# Patient Record
Sex: MALE | Race: WHITE | NOT HISPANIC OR LATINO | Employment: UNEMPLOYED | URBAN - METROPOLITAN AREA
[De-identification: names, ages, dates, MRNs, and addresses within clinical notes are randomized per-mention and may not be internally consistent; named-entity substitution may affect disease eponyms.]

---

## 2020-05-13 ENCOUNTER — LAB VISIT (OUTPATIENT)
Dept: PRIMARY CARE CLINIC | Facility: CLINIC | Age: 30
End: 2020-05-13
Payer: MEDICAID

## 2020-05-13 DIAGNOSIS — Z00.6 RESEARCH STUDY PATIENT: Primary | ICD-10-CM

## 2020-05-13 PROCEDURE — U0002 COVID-19 LAB TEST NON-CDC: HCPCS

## 2020-05-13 PROCEDURE — 86769 SARS-COV-2 COVID-19 ANTIBODY: CPT

## 2020-05-13 NOTE — RESEARCH
Date of Consent: 05/13/2020    Sponsor: [facility]    Study Title/IRB Number: Observational study of Sars-CoV2 Immunoglobulin G (IgG) seroprevalence among the [facility] population over time 2020.163  Principle Investigator: Barbara Biswas, PhD    Did the patient need translation services? No   name: N/A    Prior to the Informed Consent (IC) being signed, or any study protocol required data collection, testing, procedure, or intervention being performed, the following was done and/or discussed:   Patient was given a paper copy of the IC for review    Patient was given study FAQ   Purpose of the study and qualifications to participate    Study design and tests or procedures done at this visit   Confidentiality and HIPAA Authorization for Release of Medical Records for the research trial/ subject's rights/research related injury   Risk, Benefits, Alternative Treatments, Compensation and Costs   Participation in the research trial is voluntary and patient may withdraw at anytime   Contact information for study related questions    Patient verbalizes understanding of the above: Yes  Contact information for PI and IRB given to patient: Yes  Patient able to adequately summarize: the purpose of the study, the risks associated with the study, and all procedures, testing, and follow-ups associated with the study: Yes    The consent was discussed verbally with the patient and all questions were answered satisfactorily. Patient gave verbal consent for the Seroprevalence research study with an IRB approval date of 05/08/2020.      The Consent, Consent Witness and name of Clinical Research Coordinator consenting was captured and documented in REDCap.    All Inclusion and Exclusion Criteria reviewed, subject meets all Inclusion criteria and does not meet any Exclusion Criteria at this time.     Patient Eligibility was confirmed.    Patient responded to survey questions.    The following biospecimen  collection procedures were collected:    -Nasopharyngeal Swab Collection  -Blood collection

## 2020-05-14 LAB — SARS-COV-2 IGG SERPLBLD QL IA.RAPID: NEGATIVE

## 2020-05-15 LAB — SARS-COV-2 RNA RESP QL NAA+PROBE: NOT DETECTED

## 2020-10-01 ENCOUNTER — TELEPHONE (OUTPATIENT)
Dept: INTERNAL MEDICINE | Facility: CLINIC | Age: 30
End: 2020-10-01

## 2020-10-01 NOTE — TELEPHONE ENCOUNTER
----- Message from Rocio Pearson sent at 9/30/2020  2:18 PM CDT -----  Name of Who is Calling: NANCI BELL [[id number]]    What is the request in detail: Pt is requesting a call back regards to est  care ....Please contact to further discuss and advise      Can the clinic reply by [facility]: Yes     What Number to Call Back if not in [facility]:  [phone] (home)

## 2022-08-12 ENCOUNTER — TELEPHONE (OUTPATIENT)
Dept: PSYCHIATRY | Facility: CLINIC | Age: 32
End: 2022-08-12
Payer: MEDICAID

## 2022-09-29 ENCOUNTER — OFFICE VISIT (OUTPATIENT)
Dept: PSYCHIATRY | Facility: CLINIC | Age: 32
End: 2022-09-29
Payer: MEDICAID

## 2022-09-29 ENCOUNTER — LAB VISIT (OUTPATIENT)
Dept: LAB | Facility: OTHER | Age: 32
End: 2022-09-29
Attending: PSYCHIATRY & NEUROLOGY
Payer: MEDICAID

## 2022-09-29 VITALS
BODY MASS INDEX: 19.64 KG/M2 | HEIGHT: 72 IN | SYSTOLIC BLOOD PRESSURE: 142 MMHG | HEART RATE: 89 BPM | DIASTOLIC BLOOD PRESSURE: 75 MMHG | WEIGHT: 145 LBS

## 2022-09-29 DIAGNOSIS — F90.0 ADHD, PREDOMINANTLY INATTENTIVE TYPE: ICD-10-CM

## 2022-09-29 DIAGNOSIS — Z13.39 ADHD (ATTENTION DEFICIT HYPERACTIVITY DISORDER) EVALUATION: ICD-10-CM

## 2022-09-29 DIAGNOSIS — Z13.39 ADHD (ATTENTION DEFICIT HYPERACTIVITY DISORDER) EVALUATION: Primary | ICD-10-CM

## 2022-09-29 LAB
AMPHET+METHAMPHET UR QL: NEGATIVE
BARBITURATES UR QL SCN>200 NG/ML: NEGATIVE
BENZODIAZ UR QL SCN>200 NG/ML: NEGATIVE
BZE UR QL SCN: NEGATIVE
CANNABINOIDS UR QL SCN: NEGATIVE
CREAT UR-MCNC: 31.3 MG/DL (ref 23–375)
ETHANOL UR-MCNC: <10 MG/DL
METHADONE UR QL SCN>300 NG/ML: NEGATIVE
OPIATES UR QL SCN: NEGATIVE
PCP UR QL SCN>25 NG/ML: NEGATIVE
TOXICOLOGY INFORMATION: NORMAL

## 2022-09-29 PROCEDURE — 99213 OFFICE O/P EST LOW 20 MIN: CPT | Mod: PBBFAC | Performed by: PSYCHIATRY & NEUROLOGY

## 2022-09-29 PROCEDURE — 90792 PSYCH DIAG EVAL W/MED SRVCS: CPT | Mod: AF,HB,, | Performed by: PSYCHIATRY & NEUROLOGY

## 2022-09-29 PROCEDURE — 99999 PR PBB SHADOW E&M-EST. PATIENT-LVL III: ICD-10-PCS | Mod: PBBFAC,AF,HB, | Performed by: PSYCHIATRY & NEUROLOGY

## 2022-09-29 PROCEDURE — 3078F DIAST BP <80 MM HG: CPT | Mod: AF,HB,CPTII, | Performed by: PSYCHIATRY & NEUROLOGY

## 2022-09-29 PROCEDURE — 3077F PR MOST RECENT SYSTOLIC BLOOD PRESSURE >= 140 MM HG: ICD-10-PCS | Mod: AF,HB,CPTII, | Performed by: PSYCHIATRY & NEUROLOGY

## 2022-09-29 PROCEDURE — 99999 PR PBB SHADOW E&M-EST. PATIENT-LVL III: CPT | Mod: PBBFAC,AF,HB, | Performed by: PSYCHIATRY & NEUROLOGY

## 2022-09-29 PROCEDURE — 3078F PR MOST RECENT DIASTOLIC BLOOD PRESSURE < 80 MM HG: ICD-10-PCS | Mod: AF,HB,CPTII, | Performed by: PSYCHIATRY & NEUROLOGY

## 2022-09-29 PROCEDURE — 1159F PR MEDICATION LIST DOCUMENTED IN MEDICAL RECORD: ICD-10-PCS | Mod: AF,HB,CPTII, | Performed by: PSYCHIATRY & NEUROLOGY

## 2022-09-29 PROCEDURE — 90792 PR PSYCHIATRIC DIAGNOSTIC EVALUATION W/MEDICAL SERVICES: ICD-10-PCS | Mod: AF,HB,, | Performed by: PSYCHIATRY & NEUROLOGY

## 2022-09-29 PROCEDURE — 1160F RVW MEDS BY RX/DR IN RCRD: CPT | Mod: AF,HB,CPTII, | Performed by: PSYCHIATRY & NEUROLOGY

## 2022-09-29 PROCEDURE — 1159F MED LIST DOCD IN RCRD: CPT | Mod: AF,HB,CPTII, | Performed by: PSYCHIATRY & NEUROLOGY

## 2022-09-29 PROCEDURE — 1160F PR REVIEW ALL MEDS BY PRESCRIBER/CLIN PHARMACIST DOCUMENTED: ICD-10-PCS | Mod: AF,HB,CPTII, | Performed by: PSYCHIATRY & NEUROLOGY

## 2022-09-29 PROCEDURE — 3077F SYST BP >= 140 MM HG: CPT | Mod: AF,HB,CPTII, | Performed by: PSYCHIATRY & NEUROLOGY

## 2022-09-29 PROCEDURE — 80307 DRUG TEST PRSMV CHEM ANLYZR: CPT | Performed by: PSYCHIATRY & NEUROLOGY
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
## 2022-09-29 NOTE — PATIENT INSTRUCTIONS
Urine drug screen. If negative, to start Adderall XR 10mg in the morning.    Follow up in 4 weeks

## 2022-10-03 ENCOUNTER — PATIENT MESSAGE (OUTPATIENT)
Dept: PSYCHIATRY | Facility: CLINIC | Age: 32
End: 2022-10-03
Payer: MEDICAID

## 2022-10-04 DIAGNOSIS — F90.0 ADHD, PREDOMINANTLY INATTENTIVE TYPE: ICD-10-CM

## 2022-10-04 RX ORDER — DEXTROAMPHETAMINE SACCHARATE, AMPHETAMINE ASPARTATE MONOHYDRATE, DEXTROAMPHETAMINE SULFATE AND AMPHETAMINE SULFATE 2.5; 2.5; 2.5; 2.5 MG/1; MG/1; MG/1; MG/1
10 CAPSULE, EXTENDED RELEASE ORAL EVERY MORNING
Qty: 30 CAPSULE | Refills: 0 | Status: SHIPPED | OUTPATIENT
Start: 2022-10-04 | End: 2022-11-02

## 2022-10-04 RX ORDER — DEXTROAMPHETAMINE SACCHARATE, AMPHETAMINE ASPARTATE MONOHYDRATE, DEXTROAMPHETAMINE SULFATE AND AMPHETAMINE SULFATE 2.5; 2.5; 2.5; 2.5 MG/1; MG/1; MG/1; MG/1
10 CAPSULE, EXTENDED RELEASE ORAL EVERY MORNING
Qty: 30 CAPSULE | Refills: 0 | Status: SHIPPED | OUTPATIENT
Start: 2022-10-04 | End: 2022-10-04 | Stop reason: SDUPTHER

## 2022-10-12 PROBLEM — Z13.39 ADHD (ATTENTION DEFICIT HYPERACTIVITY DISORDER) EVALUATION: Status: ACTIVE | Noted: 2022-10-12

## 2022-11-02 ENCOUNTER — OFFICE VISIT (OUTPATIENT)
Dept: PSYCHIATRY | Facility: CLINIC | Age: 32
End: 2022-11-02
Payer: MEDICAID

## 2022-11-02 VITALS
HEART RATE: 84 BPM | DIASTOLIC BLOOD PRESSURE: 73 MMHG | SYSTOLIC BLOOD PRESSURE: 125 MMHG | BODY MASS INDEX: 19.7 KG/M2 | TEMPERATURE: 98 F | WEIGHT: 145.31 LBS

## 2022-11-02 DIAGNOSIS — F90.0 ADHD, PREDOMINANTLY INATTENTIVE TYPE: Primary | ICD-10-CM

## 2022-11-02 PROCEDURE — 3008F BODY MASS INDEX DOCD: CPT | Mod: AF,HB,CPTII, | Performed by: PSYCHIATRY & NEUROLOGY

## 2022-11-02 PROCEDURE — 1160F PR REVIEW ALL MEDS BY PRESCRIBER/CLIN PHARMACIST DOCUMENTED: ICD-10-PCS | Mod: AF,HB,CPTII, | Performed by: PSYCHIATRY & NEUROLOGY

## 2022-11-02 PROCEDURE — 3078F DIAST BP <80 MM HG: CPT | Mod: AF,HB,CPTII, | Performed by: PSYCHIATRY & NEUROLOGY

## 2022-11-02 PROCEDURE — 3078F PR MOST RECENT DIASTOLIC BLOOD PRESSURE < 80 MM HG: ICD-10-PCS | Mod: AF,HB,CPTII, | Performed by: PSYCHIATRY & NEUROLOGY

## 2022-11-02 PROCEDURE — 1159F MED LIST DOCD IN RCRD: CPT | Mod: AF,HB,CPTII, | Performed by: PSYCHIATRY & NEUROLOGY

## 2022-11-02 PROCEDURE — 99213 OFFICE O/P EST LOW 20 MIN: CPT | Mod: PBBFAC | Performed by: PSYCHIATRY & NEUROLOGY

## 2022-11-02 PROCEDURE — 1159F PR MEDICATION LIST DOCUMENTED IN MEDICAL RECORD: ICD-10-PCS | Mod: AF,HB,CPTII, | Performed by: PSYCHIATRY & NEUROLOGY

## 2022-11-02 PROCEDURE — 99214 OFFICE O/P EST MOD 30 MIN: CPT | Mod: S$PBB,AF,HB, | Performed by: PSYCHIATRY & NEUROLOGY

## 2022-11-02 PROCEDURE — 99999 PR PBB SHADOW E&M-EST. PATIENT-LVL III: CPT | Mod: PBBFAC,AF,HB, | Performed by: PSYCHIATRY & NEUROLOGY

## 2022-11-02 PROCEDURE — 1160F RVW MEDS BY RX/DR IN RCRD: CPT | Mod: AF,HB,CPTII, | Performed by: PSYCHIATRY & NEUROLOGY

## 2022-11-02 PROCEDURE — 3008F PR BODY MASS INDEX (BMI) DOCUMENTED: ICD-10-PCS | Mod: AF,HB,CPTII, | Performed by: PSYCHIATRY & NEUROLOGY

## 2022-11-02 PROCEDURE — 99214 PR OFFICE/OUTPT VISIT, EST, LEVL IV, 30-39 MIN: ICD-10-PCS | Mod: S$PBB,AF,HB, | Performed by: PSYCHIATRY & NEUROLOGY

## 2022-11-02 PROCEDURE — 99999 PR PBB SHADOW E&M-EST. PATIENT-LVL III: ICD-10-PCS | Mod: PBBFAC,AF,HB, | Performed by: PSYCHIATRY & NEUROLOGY

## 2022-11-02 PROCEDURE — 3074F PR MOST RECENT SYSTOLIC BLOOD PRESSURE < 130 MM HG: ICD-10-PCS | Mod: AF,HB,CPTII, | Performed by: PSYCHIATRY & NEUROLOGY

## 2022-11-02 PROCEDURE — 3074F SYST BP LT 130 MM HG: CPT | Mod: AF,HB,CPTII, | Performed by: PSYCHIATRY & NEUROLOGY

## 2022-11-02 NOTE — PROGRESS NOTES
"Subsequent Psychiatric Session    32 y.o. male    Reason for Follow Up:   1. ADHD, predominantly inattentive type          Current Medications:    Current Outpatient Medications:     methylphenidate HCl 36 MG CR tablet, Take 1 tablet (36 mg total) by mouth every morning., Disp: 30 tablet, Rfl: 0     Date of Last Visit:   9/29/2022    In Clinic Since:   September 2022  Therapy:    No    Interval History  Patient describes current mood as "more on edge." The anxiety "comes and goes." He still has some trouble retaining information but feels "more engaged with work and reading socially." He likes reading on mysticism and kabbalism. He thinks the medication works from about 9am to 6pm. He does not have appetite suppression. Denies medication side effects. Denies SI, HI, AVH.    Patient's short term memory is tested, and he correctly recalls 3/3 words.    Manic/Hypomanic Symptom Screening:  Since the last session, have you had any periods lasting at least a few days where your energy level was higher than normal and you did not need as much sleep at night to function the following day?: No    Substance Use Screening:  Alcohol: 2/30 days  Cannabis: No  Anything recreational that is not prescribed: No    Review of Measures  PHQ-9: 10  RISHI-7: 5    Scores from last session:   PHQ9 Score:              4/27  GAD7 Score:              5/21  MoCA:                         25/30  ASRS:                         see media    Review of PDMP  Reviewed this session Filled Adderall on 10/04    Constitutional     VITAL SIGNS  Temp 98.2 °F (36.8 °C)   /73    HR 84    RR      SpO2           Lab Visit on 09/29/2022   Component Date Value Ref Range Status    Alcohol, Urine 09/29/2022 <10  <10 mg/dL Final    Benzodiazepines 09/29/2022 Negative  Negatiive Final    Methadone metabolites 09/29/2022 Negative  Negative Final    Cocaine (Metab.) 09/29/2022 Negative  Negative Final    Opiate Scrn, Ur 09/29/2022 Negative  Negative Final    " "Barbiturate Screen, Ur 09/29/2022 Negative  Negative Final    Amphetamine Screen, Ur 09/29/2022 Negative  Negative Final    THC 09/29/2022 Negative  Negative Final    Phencyclidine 09/29/2022 Negative  Negative Final    Creatinine, Urine 09/29/2022 31.3  23.0 - 375.0 mg/dL Final    Toxicology Information 09/29/2022 SEE COMMENT   Final    Comment: This screen includes the following classes of drugs at the listed   cut-off:    Benzodiazepines 200 ng/ml  Methadone 300 ng/ml  Cocaine metabolite 300 ng/ml  Opiates 300 ng/ml  Barbiturates 200 ng/ml  Amphetamines 1000 ng/ml  Marijuana metabs (THC) 50 ng/ml  Phencyclidine (PCP) 25 ng/ml    This is a screening test. If results do not correlate with clinical   presentation, then a confirmatory send out test is advised.     This report is intended for use in clinical monitoring and management   of   patients. It is not intended for use in employment related drug   testing.          MENTAL STATUS EXAM  General:  Alert and oriented x 4 Appearance is good grooming and hygiene, appears stated age, Body mass index is 19.67 kg/m². . Behavior: cooperative, eye contact normal.  Gait, Posture and Muscle Strength/Tone: Normal gait and posture observed while watching patient walk to exam room. No gross abnormal movements noted.  Psychomotor Agitation and Retardation: none evident  Speech: Normal rate, volume, articulation, and tone.  Mood: "more on edge"  Affect: blunted  Thought Process: Linear and coherent. No flight of ideas.  Associations:  Intact. No loosening of associations.  Thought content: Denies suicidal and homicidal thoughts, plans and intentions.  Perceptions: Denies any auditory or visual hallucinations. Does not appear internally preoccupied.  Orientation: Alert and oriented to person, place, date and situation  Attention and Concentration: Intact.   Memory: Intact grossly   Language: No deficits noted   Fund of Knowledge: appropriate for age and level and education. "   Insight:   good  Judgment: good  Impulse control: good      ASSESSMENT  Problem List Items Addressed This Visit          Psychiatric    ADHD, predominantly inattentive type - Primary    Relevant Medications    methylphenidate HCl 36 MG CR tablet     Will switch to Concerta since patient feels more on edge with adderall.     PLAN  Patient Instructions   Switch Adderall XR 10mg daily to Concerta 36mg daily.    Follow up in 4 weeks      -------------------------------------------------------------------------------    Natalie Herron  [facility] - PSYCHIATRY    Today's encounter took a total time of 30 minutes, and that time included patient interview and documentation.    Risks, Benefits, Side Effects and Alternatives were discussed with the patient today and consent was obtained for the current medication regimen. The patient was encouraged to ask questions and these questions were answered and the patient was engaged in psychoeducation regarding diagnosis, course of illness, accuracy of the diagnosis, and other general elements regarding overall diagnosis and treatment consideration.     Any medications being used off-label were discussed with the patient inclusive of the evidence base for the use of the medications and consent was obtained for the off-label use of the medication.     Brief suicide risk assessment was performed with the patient today and safety planning was performed if indicated.    Note completed by: Natalie Herron MD, 11/5/2022 11:02 AM

## 2022-11-03 ENCOUNTER — PATIENT MESSAGE (OUTPATIENT)
Dept: PSYCHIATRY | Facility: CLINIC | Age: 32
End: 2022-11-03
Payer: MEDICAID

## 2022-11-03 RX ORDER — METHYLPHENIDATE HYDROCHLORIDE 36 MG/1
36 TABLET ORAL EVERY MORNING
Qty: 30 TABLET | Refills: 0 | Status: SHIPPED | OUTPATIENT
Start: 2022-11-03 | End: 2022-11-08 | Stop reason: SDUPTHER

## 2022-11-08 ENCOUNTER — PATIENT MESSAGE (OUTPATIENT)
Dept: PSYCHIATRY | Facility: CLINIC | Age: 32
End: 2022-11-08
Payer: MEDICAID

## 2022-11-08 DIAGNOSIS — F90.0 ADHD, PREDOMINANTLY INATTENTIVE TYPE: ICD-10-CM

## 2022-11-08 RX ORDER — METHYLPHENIDATE HYDROCHLORIDE 36 MG/1
36 TABLET ORAL EVERY MORNING
Qty: 30 TABLET | Refills: 0 | Status: SHIPPED | OUTPATIENT
Start: 2022-11-08 | End: 2022-11-16 | Stop reason: SDUPTHER

## 2022-11-16 ENCOUNTER — PATIENT MESSAGE (OUTPATIENT)
Dept: PSYCHIATRY | Facility: CLINIC | Age: 32
End: 2022-11-16
Payer: MEDICAID

## 2022-11-16 DIAGNOSIS — F90.0 ADHD, PREDOMINANTLY INATTENTIVE TYPE: ICD-10-CM

## 2022-11-16 RX ORDER — METHYLPHENIDATE HYDROCHLORIDE 36 MG/1
36 TABLET ORAL EVERY MORNING
Qty: 30 TABLET | Refills: 0 | Status: SHIPPED | OUTPATIENT
Start: 2022-11-16 | End: 2022-12-03

## 2022-11-28 ENCOUNTER — PATIENT MESSAGE (OUTPATIENT)
Dept: PSYCHIATRY | Facility: CLINIC | Age: 32
End: 2022-11-28
Payer: MEDICAID

## 2022-12-03 RX ORDER — DEXTROAMPHETAMINE SACCHARATE, AMPHETAMINE ASPARTATE MONOHYDRATE, DEXTROAMPHETAMINE SULFATE AND AMPHETAMINE SULFATE 2.5; 2.5; 2.5; 2.5 MG/1; MG/1; MG/1; MG/1
10 CAPSULE, EXTENDED RELEASE ORAL EVERY MORNING
Qty: 30 CAPSULE | Refills: 0 | Status: SHIPPED | OUTPATIENT
Start: 2022-12-03 | End: 2023-01-07 | Stop reason: SDUPTHER

## 2023-01-09 RX ORDER — DEXTROAMPHETAMINE SACCHARATE, AMPHETAMINE ASPARTATE MONOHYDRATE, DEXTROAMPHETAMINE SULFATE AND AMPHETAMINE SULFATE 2.5; 2.5; 2.5; 2.5 MG/1; MG/1; MG/1; MG/1
10 CAPSULE, EXTENDED RELEASE ORAL EVERY MORNING
Qty: 30 CAPSULE | Refills: 0 | Status: SHIPPED | OUTPATIENT
Start: 2023-01-09 | End: 2023-02-08 | Stop reason: SDUPTHER

## 2023-02-08 RX ORDER — DEXTROAMPHETAMINE SACCHARATE, AMPHETAMINE ASPARTATE MONOHYDRATE, DEXTROAMPHETAMINE SULFATE AND AMPHETAMINE SULFATE 2.5; 2.5; 2.5; 2.5 MG/1; MG/1; MG/1; MG/1
10 CAPSULE, EXTENDED RELEASE ORAL EVERY MORNING
Qty: 30 CAPSULE | Refills: 0 | Status: SHIPPED | OUTPATIENT
Start: 2023-02-08 | End: 2023-02-28 | Stop reason: SDUPTHER

## 2023-02-23 ENCOUNTER — TELEPHONE (OUTPATIENT)
Dept: PSYCHIATRY | Facility: CLINIC | Age: 33
End: 2023-02-23
Payer: MEDICAID

## 2023-02-28 ENCOUNTER — OFFICE VISIT (OUTPATIENT)
Dept: PSYCHIATRY | Facility: CLINIC | Age: 33
End: 2023-02-28
Payer: MEDICAID

## 2023-02-28 VITALS — HEART RATE: 81 BPM | WEIGHT: 145 LBS | BODY MASS INDEX: 19.67 KG/M2

## 2023-02-28 DIAGNOSIS — F90.0 ADHD, PREDOMINANTLY INATTENTIVE TYPE: Primary | ICD-10-CM

## 2023-02-28 PROCEDURE — 99214 PR OFFICE/OUTPT VISIT, EST, LEVL IV, 30-39 MIN: ICD-10-PCS | Mod: 95,,, | Performed by: PSYCHIATRY & NEUROLOGY

## 2023-02-28 PROCEDURE — 1160F PR REVIEW ALL MEDS BY PRESCRIBER/CLIN PHARMACIST DOCUMENTED: ICD-10-PCS | Mod: CPTII,95,, | Performed by: PSYCHIATRY & NEUROLOGY

## 2023-02-28 PROCEDURE — 1159F MED LIST DOCD IN RCRD: CPT | Mod: CPTII,95,, | Performed by: PSYCHIATRY & NEUROLOGY

## 2023-02-28 PROCEDURE — 1160F RVW MEDS BY RX/DR IN RCRD: CPT | Mod: CPTII,95,, | Performed by: PSYCHIATRY & NEUROLOGY

## 2023-02-28 PROCEDURE — 3008F BODY MASS INDEX DOCD: CPT | Mod: CPTII,95,, | Performed by: PSYCHIATRY & NEUROLOGY

## 2023-02-28 PROCEDURE — 1159F PR MEDICATION LIST DOCUMENTED IN MEDICAL RECORD: ICD-10-PCS | Mod: CPTII,95,, | Performed by: PSYCHIATRY & NEUROLOGY

## 2023-02-28 PROCEDURE — 3008F PR BODY MASS INDEX (BMI) DOCUMENTED: ICD-10-PCS | Mod: CPTII,95,, | Performed by: PSYCHIATRY & NEUROLOGY

## 2023-02-28 PROCEDURE — 99214 OFFICE O/P EST MOD 30 MIN: CPT | Mod: 95,,, | Performed by: PSYCHIATRY & NEUROLOGY

## 2023-02-28 RX ORDER — DEXTROAMPHETAMINE SACCHARATE, AMPHETAMINE ASPARTATE, DEXTROAMPHETAMINE SULFATE AND AMPHETAMINE SULFATE 2.5; 2.5; 2.5; 2.5 MG/1; MG/1; MG/1; MG/1
10 TABLET ORAL DAILY
Qty: 30 TABLET | Refills: 0 | Status: SHIPPED | OUTPATIENT
Start: 2023-02-28 | End: 2023-03-01 | Stop reason: SDUPTHER

## 2023-02-28 RX ORDER — DEXTROAMPHETAMINE SACCHARATE, AMPHETAMINE ASPARTATE MONOHYDRATE, DEXTROAMPHETAMINE SULFATE AND AMPHETAMINE SULFATE 2.5; 2.5; 2.5; 2.5 MG/1; MG/1; MG/1; MG/1
10 CAPSULE, EXTENDED RELEASE ORAL DAILY
Qty: 30 CAPSULE | Refills: 0 | Status: SHIPPED | OUTPATIENT
Start: 2023-03-08 | End: 2023-03-01 | Stop reason: SDUPTHER

## 2023-02-28 NOTE — PATIENT INSTRUCTIONS
Try taking Adderall XR 10mg closer to 8:30am.  Start Adderall IR 10mg in the afternoon    Follow up in May.

## 2023-02-28 NOTE — PROGRESS NOTES
"Subsequent Psychiatric Session    32 y.o. male    Reason for Follow Up:   1. ADHD, predominantly inattentive type        Current Medications:    Current Outpatient Medications:     [START ON 3/8/2023] dextroamphetamine-amphetamine (ADDERALL XR) 10 MG 24 hr capsule, Take 1 capsule (10 mg total) by mouth once daily., Disp: 30 capsule, Rfl: 0    dextroamphetamine-amphetamine (ADDERALL) 10 mg Tab, Take 1 tablet (10 mg total) by mouth once daily., Disp: 30 tablet, Rfl: 0     Date of Last Visit:   11/02/2022    In Clinic Since:   September 2022  Therapy:    No    Interval History  Patient says that overall: "I've been doing fine." Patient feels more "sociable" and "less in my own head," but it has a limited effect: "The medication seems to work for the first few hours and then I get pretty tired around 2 or 3pm." He takes it at 7:30am. Adderall is a better medication than Concerta. Concerta caused nausea. His memory has not improved. Denies medication side effects, including insomnia and appetite suppression. Denies SI, HI, AVH.    Manic/Hypomanic Symptom Screening:  Since the last session, have you had any periods lasting at least a few days where your energy level was higher than normal and you did not need as much sleep at night to function the following day?: No    Substance Use Screening:  Alcohol: 4/30 days  Cannabis: No  Anything recreational that is not prescribed: No    Review of Measures  PHQ-9: 2  RISHI-7: 2    Scores from last session:   PHQ-9: 10  RISHI-7: 5    Scores from initial session:  PHQ9 Score:              4/27  GAD7 Score:              5/21  MoCA:                         25/30  ASRS:                         see media    Review of PDMP  Reviewed this session Filled Adderall on 02/14    Constitutional     VITAL SIGNS  Temp     BP      HR 81    RR      SpO2           No visits with results within 14 Week(s) from this visit.   Latest known visit with results is:   Lab Visit on 09/29/2022   Component Date " "Value Ref Range Status    Alcohol, Urine 09/29/2022 <10  <10 mg/dL Final    Benzodiazepines 09/29/2022 Negative  Negatiive Final    Methadone metabolites 09/29/2022 Negative  Negative Final    Cocaine (Metab.) 09/29/2022 Negative  Negative Final    Opiate Scrn, Ur 09/29/2022 Negative  Negative Final    Barbiturate Screen, Ur 09/29/2022 Negative  Negative Final    Amphetamine Screen, Ur 09/29/2022 Negative  Negative Final    THC 09/29/2022 Negative  Negative Final    Phencyclidine 09/29/2022 Negative  Negative Final    Creatinine, Urine 09/29/2022 31.3  23.0 - 375.0 mg/dL Final    Toxicology Information 09/29/2022 SEE COMMENT   Final    Comment: This screen includes the following classes of drugs at the listed   cut-off:    Benzodiazepines 200 ng/ml  Methadone 300 ng/ml  Cocaine metabolite 300 ng/ml  Opiates 300 ng/ml  Barbiturates 200 ng/ml  Amphetamines 1000 ng/ml  Marijuana metabs (THC) 50 ng/ml  Phencyclidine (PCP) 25 ng/ml    This is a screening test. If results do not correlate with clinical   presentation, then a confirmatory send out test is advised.     This report is intended for use in clinical monitoring and management   of   patients. It is not intended for use in employment related drug   testing.          MENTAL STATUS EXAM  General:  Alert and oriented x 4 Appearance is good grooming and hygiene, appears stated age, Body mass index is 19.67 kg/m². Behavior: cooperative, eye contact normal.  Gait, Posture and Muscle Strength/Tone: Normal gait and posture observed. No gross abnormal movements noted.  Psychomotor Agitation and Retardation: none evident  Speech: Normal rate, volume, articulation, and tone.  Mood: "better"  Affect: blunted  Thought Process: Linear and coherent. No flight of ideas.  Associations:  Intact. No loosening of associations.  Thought content: Denies suicidal and homicidal thoughts, plans and intentions.  Perceptions: Denies any auditory or visual hallucinations. Does not appear " internally preoccupied.  Orientation: Alert and oriented to person, place, date and situation  Attention and Concentration: Intact.   Memory: Intact grossly   Language: No deficits noted   Fund of Knowledge: appropriate for age and level and education.   Insight:   good  Judgment: good  Impulse control: good      ASSESSMENT  Problem List Items Addressed This Visit          Psychiatric    ADHD, predominantly inattentive type - Primary    Relevant Medications    dextroamphetamine-amphetamine (ADDERALL) 10 mg Tab    dextroamphetamine-amphetamine (ADDERALL XR) 10 MG 24 hr capsule (Start on 3/8/2023)     PLAN  Patient Instructions   Try taking Adderall XR 10mg closer to 8:30am.  Start Adderall IR 10mg in the afternoon    Follow up in May.      -------------------------------------------------------------------------------    Natalie Herron  [facility] - PSYCHIATRY    Today's encounter took a total time of 30 minutes, and that time included patient interview and documentation.    Due to the pandemic, today's session was performed over video and then phone due to audio issues. Patient is confirmed to be in the [facility]. The participants are patient and myself.     Risks, Benefits, Side Effects and Alternatives were discussed with the patient today and consent was obtained for the current medication regimen. The patient was encouraged to ask questions and these questions were answered and the patient was engaged in psychoeducation regarding diagnosis, course of illness, accuracy of the diagnosis, and other general elements regarding overall diagnosis and treatment consideration.     Any medications being used off-label were discussed with the patient inclusive of the evidence base for the use of the medications and consent was obtained for the off-label use of the medication.     Brief suicide risk assessment was performed with the patient today and safety planning was performed if indicated.    Note completed by:  Natalie Herron MD, 2/28/2023 11:02 AM

## 2023-03-01 ENCOUNTER — PATIENT MESSAGE (OUTPATIENT)
Dept: PSYCHIATRY | Facility: CLINIC | Age: 33
End: 2023-03-01
Payer: MEDICAID

## 2023-03-01 DIAGNOSIS — F90.0 ADHD, PREDOMINANTLY INATTENTIVE TYPE: ICD-10-CM

## 2023-03-01 RX ORDER — DEXTROAMPHETAMINE SACCHARATE, AMPHETAMINE ASPARTATE, DEXTROAMPHETAMINE SULFATE AND AMPHETAMINE SULFATE 2.5; 2.5; 2.5; 2.5 MG/1; MG/1; MG/1; MG/1
10 TABLET ORAL DAILY
Qty: 30 TABLET | Refills: 0 | Status: SHIPPED | OUTPATIENT
Start: 2023-03-01 | End: 2023-03-02 | Stop reason: SDUPTHER

## 2023-03-01 RX ORDER — DEXTROAMPHETAMINE SACCHARATE, AMPHETAMINE ASPARTATE MONOHYDRATE, DEXTROAMPHETAMINE SULFATE AND AMPHETAMINE SULFATE 2.5; 2.5; 2.5; 2.5 MG/1; MG/1; MG/1; MG/1
10 CAPSULE, EXTENDED RELEASE ORAL DAILY
Qty: 30 CAPSULE | Refills: 0 | Status: SHIPPED | OUTPATIENT
Start: 2023-03-08 | End: 2023-03-02 | Stop reason: SDUPTHER

## 2023-03-02 DIAGNOSIS — F90.0 ADHD, PREDOMINANTLY INATTENTIVE TYPE: ICD-10-CM

## 2023-03-02 RX ORDER — DEXTROAMPHETAMINE SACCHARATE, AMPHETAMINE ASPARTATE, DEXTROAMPHETAMINE SULFATE AND AMPHETAMINE SULFATE 2.5; 2.5; 2.5; 2.5 MG/1; MG/1; MG/1; MG/1
10 TABLET ORAL DAILY
Qty: 30 TABLET | Refills: 0 | Status: SHIPPED | OUTPATIENT
Start: 2023-03-02 | End: 2023-03-15 | Stop reason: SDUPTHER

## 2023-03-02 RX ORDER — DEXTROAMPHETAMINE SACCHARATE, AMPHETAMINE ASPARTATE MONOHYDRATE, DEXTROAMPHETAMINE SULFATE AND AMPHETAMINE SULFATE 2.5; 2.5; 2.5; 2.5 MG/1; MG/1; MG/1; MG/1
10 CAPSULE, EXTENDED RELEASE ORAL DAILY
Qty: 30 CAPSULE | Refills: 0 | Status: SHIPPED | OUTPATIENT
Start: 2023-03-08 | End: 2023-03-15 | Stop reason: SDUPTHER

## 2023-03-15 ENCOUNTER — PATIENT MESSAGE (OUTPATIENT)
Dept: PSYCHIATRY | Facility: CLINIC | Age: 33
End: 2023-03-15
Payer: MEDICAID

## 2023-03-15 DIAGNOSIS — F90.0 ADHD, PREDOMINANTLY INATTENTIVE TYPE: ICD-10-CM

## 2023-03-15 RX ORDER — DEXTROAMPHETAMINE SACCHARATE, AMPHETAMINE ASPARTATE, DEXTROAMPHETAMINE SULFATE AND AMPHETAMINE SULFATE 2.5; 2.5; 2.5; 2.5 MG/1; MG/1; MG/1; MG/1
10 TABLET ORAL DAILY
Qty: 30 TABLET | Refills: 0 | Status: SHIPPED | OUTPATIENT
Start: 2023-03-15 | End: 2023-06-29 | Stop reason: SDUPTHER

## 2023-03-15 RX ORDER — DEXTROAMPHETAMINE SACCHARATE, AMPHETAMINE ASPARTATE MONOHYDRATE, DEXTROAMPHETAMINE SULFATE AND AMPHETAMINE SULFATE 2.5; 2.5; 2.5; 2.5 MG/1; MG/1; MG/1; MG/1
10 CAPSULE, EXTENDED RELEASE ORAL DAILY
Qty: 30 CAPSULE | Refills: 0 | Status: SHIPPED | OUTPATIENT
Start: 2023-03-15 | End: 2023-04-20 | Stop reason: SDUPTHER

## 2023-04-19 ENCOUNTER — PATIENT MESSAGE (OUTPATIENT)
Dept: PSYCHIATRY | Facility: CLINIC | Age: 33
End: 2023-04-19
Payer: MEDICAID

## 2023-04-20 DIAGNOSIS — F90.0 ADHD, PREDOMINANTLY INATTENTIVE TYPE: ICD-10-CM

## 2023-04-20 RX ORDER — DEXTROAMPHETAMINE SACCHARATE, AMPHETAMINE ASPARTATE MONOHYDRATE, DEXTROAMPHETAMINE SULFATE AND AMPHETAMINE SULFATE 2.5; 2.5; 2.5; 2.5 MG/1; MG/1; MG/1; MG/1
10 CAPSULE, EXTENDED RELEASE ORAL DAILY
Qty: 30 CAPSULE | Refills: 0 | Status: SHIPPED | OUTPATIENT
Start: 2023-04-20 | End: 2023-05-23 | Stop reason: SDUPTHER

## 2023-05-23 DIAGNOSIS — F90.0 ADHD, PREDOMINANTLY INATTENTIVE TYPE: ICD-10-CM

## 2023-05-23 RX ORDER — DEXTROAMPHETAMINE SACCHARATE, AMPHETAMINE ASPARTATE MONOHYDRATE, DEXTROAMPHETAMINE SULFATE AND AMPHETAMINE SULFATE 2.5; 2.5; 2.5; 2.5 MG/1; MG/1; MG/1; MG/1
10 CAPSULE, EXTENDED RELEASE ORAL DAILY
Qty: 30 CAPSULE | Refills: 0 | Status: SHIPPED | OUTPATIENT
Start: 2023-05-23 | End: 2023-06-29 | Stop reason: SDUPTHER

## 2023-05-31 ENCOUNTER — PATIENT MESSAGE (OUTPATIENT)
Dept: PSYCHIATRY | Facility: CLINIC | Age: 33
End: 2023-05-31
Payer: MEDICAID

## 2023-06-29 DIAGNOSIS — F90.0 ADHD, PREDOMINANTLY INATTENTIVE TYPE: ICD-10-CM

## 2023-06-29 DIAGNOSIS — F90.0 ADHD, PREDOMINANTLY INATTENTIVE TYPE: Primary | ICD-10-CM

## 2023-06-29 RX ORDER — DEXTROAMPHETAMINE SACCHARATE, AMPHETAMINE ASPARTATE, DEXTROAMPHETAMINE SULFATE AND AMPHETAMINE SULFATE 2.5; 2.5; 2.5; 2.5 MG/1; MG/1; MG/1; MG/1
10 TABLET ORAL DAILY
Qty: 30 TABLET | Refills: 0 | Status: SHIPPED | OUTPATIENT
Start: 2023-06-29

## 2023-06-29 RX ORDER — DEXTROAMPHETAMINE SACCHARATE, AMPHETAMINE ASPARTATE MONOHYDRATE, DEXTROAMPHETAMINE SULFATE AND AMPHETAMINE SULFATE 2.5; 2.5; 2.5; 2.5 MG/1; MG/1; MG/1; MG/1
10 CAPSULE, EXTENDED RELEASE ORAL EVERY MORNING
Qty: 30 CAPSULE | Refills: 0 | Status: SHIPPED | OUTPATIENT
Start: 2023-08-29

## 2023-06-29 RX ORDER — DEXTROAMPHETAMINE SACCHARATE, AMPHETAMINE ASPARTATE, DEXTROAMPHETAMINE SULFATE AND AMPHETAMINE SULFATE 2.5; 2.5; 2.5; 2.5 MG/1; MG/1; MG/1; MG/1
10 TABLET ORAL
Qty: 30 TABLET | Refills: 0 | Status: SHIPPED | OUTPATIENT
Start: 2023-08-29

## 2023-06-29 RX ORDER — DEXTROAMPHETAMINE SACCHARATE, AMPHETAMINE ASPARTATE, DEXTROAMPHETAMINE SULFATE AND AMPHETAMINE SULFATE 2.5; 2.5; 2.5; 2.5 MG/1; MG/1; MG/1; MG/1
10 TABLET ORAL
Qty: 30 TABLET | Refills: 0 | Status: SHIPPED | OUTPATIENT
Start: 2023-07-29

## 2023-06-29 RX ORDER — DEXTROAMPHETAMINE SACCHARATE, AMPHETAMINE ASPARTATE MONOHYDRATE, DEXTROAMPHETAMINE SULFATE AND AMPHETAMINE SULFATE 2.5; 2.5; 2.5; 2.5 MG/1; MG/1; MG/1; MG/1
10 CAPSULE, EXTENDED RELEASE ORAL DAILY
Qty: 30 CAPSULE | Refills: 0 | Status: SHIPPED | OUTPATIENT
Start: 2023-06-29

## 2023-06-29 RX ORDER — DEXTROAMPHETAMINE SACCHARATE, AMPHETAMINE ASPARTATE MONOHYDRATE, DEXTROAMPHETAMINE SULFATE AND AMPHETAMINE SULFATE 2.5; 2.5; 2.5; 2.5 MG/1; MG/1; MG/1; MG/1
10 CAPSULE, EXTENDED RELEASE ORAL EVERY MORNING
Qty: 30 CAPSULE | Refills: 0 | Status: SHIPPED | OUTPATIENT
Start: 2023-07-29

## 2023-07-14 ENCOUNTER — OFFICE VISIT (OUTPATIENT)
Dept: PSYCHIATRY | Facility: CLINIC | Age: 33
End: 2023-07-14
Payer: MEDICAID

## 2023-07-14 ENCOUNTER — TELEPHONE (OUTPATIENT)
Dept: ADMINISTRATIVE | Facility: OTHER | Age: 33
End: 2023-07-14
Payer: MEDICAID

## 2023-07-14 ENCOUNTER — LAB VISIT (OUTPATIENT)
Dept: LAB | Facility: HOSPITAL | Age: 33
End: 2023-07-14
Attending: STUDENT IN AN ORGANIZED HEALTH CARE EDUCATION/TRAINING PROGRAM
Payer: MEDICAID

## 2023-07-14 VITALS
DIASTOLIC BLOOD PRESSURE: 79 MMHG | WEIGHT: 141.19 LBS | HEART RATE: 81 BPM | BODY MASS INDEX: 19.15 KG/M2 | SYSTOLIC BLOOD PRESSURE: 126 MMHG

## 2023-07-14 DIAGNOSIS — F33.2 SEVERE EPISODE OF RECURRENT MAJOR DEPRESSIVE DISORDER, WITHOUT PSYCHOTIC FEATURES: Primary | ICD-10-CM

## 2023-07-14 DIAGNOSIS — F33.2 SEVERE EPISODE OF RECURRENT MAJOR DEPRESSIVE DISORDER, WITHOUT PSYCHOTIC FEATURES: ICD-10-CM

## 2023-07-14 DIAGNOSIS — F40.10 SOCIAL ANXIETY DISORDER: ICD-10-CM

## 2023-07-14 LAB
T3FREE SERPL-MCNC: 3.1 PG/ML (ref 2.3–4.2)
T4 FREE SERPL-MCNC: 0.97 NG/DL (ref 0.71–1.51)
TSH SERPL DL<=0.005 MIU/L-ACNC: 2.01 UIU/ML (ref 0.4–4)

## 2023-07-14 PROCEDURE — 1160F PR REVIEW ALL MEDS BY PRESCRIBER/CLIN PHARMACIST DOCUMENTED: ICD-10-PCS | Mod: CPTII,,, | Performed by: STUDENT IN AN ORGANIZED HEALTH CARE EDUCATION/TRAINING PROGRAM

## 2023-07-14 PROCEDURE — 1160F RVW MEDS BY RX/DR IN RCRD: CPT | Mod: CPTII,,, | Performed by: STUDENT IN AN ORGANIZED HEALTH CARE EDUCATION/TRAINING PROGRAM

## 2023-07-14 PROCEDURE — 3078F DIAST BP <80 MM HG: CPT | Mod: CPTII,,, | Performed by: STUDENT IN AN ORGANIZED HEALTH CARE EDUCATION/TRAINING PROGRAM

## 2023-07-14 PROCEDURE — 3008F BODY MASS INDEX DOCD: CPT | Mod: CPTII,,, | Performed by: STUDENT IN AN ORGANIZED HEALTH CARE EDUCATION/TRAINING PROGRAM

## 2023-07-14 PROCEDURE — 99999 PR PBB SHADOW E&M-EST. PATIENT-LVL II: ICD-10-PCS | Mod: PBBFAC,,, | Performed by: STUDENT IN AN ORGANIZED HEALTH CARE EDUCATION/TRAINING PROGRAM

## 2023-07-14 PROCEDURE — 1159F MED LIST DOCD IN RCRD: CPT | Mod: CPTII,,, | Performed by: STUDENT IN AN ORGANIZED HEALTH CARE EDUCATION/TRAINING PROGRAM

## 2023-07-14 PROCEDURE — 90792 PR PSYCHIATRIC DIAGNOSTIC EVALUATION W/MEDICAL SERVICES: ICD-10-PCS | Mod: ,,, | Performed by: STUDENT IN AN ORGANIZED HEALTH CARE EDUCATION/TRAINING PROGRAM

## 2023-07-14 PROCEDURE — 1159F PR MEDICATION LIST DOCUMENTED IN MEDICAL RECORD: ICD-10-PCS | Mod: CPTII,,, | Performed by: STUDENT IN AN ORGANIZED HEALTH CARE EDUCATION/TRAINING PROGRAM

## 2023-07-14 PROCEDURE — 84481 FREE ASSAY (FT-3): CPT | Performed by: STUDENT IN AN ORGANIZED HEALTH CARE EDUCATION/TRAINING PROGRAM

## 2023-07-14 PROCEDURE — 84443 ASSAY THYROID STIM HORMONE: CPT | Performed by: STUDENT IN AN ORGANIZED HEALTH CARE EDUCATION/TRAINING PROGRAM

## 2023-07-14 PROCEDURE — 90792 PSYCH DIAG EVAL W/MED SRVCS: CPT | Mod: ,,, | Performed by: STUDENT IN AN ORGANIZED HEALTH CARE EDUCATION/TRAINING PROGRAM

## 2023-07-14 PROCEDURE — 3078F PR MOST RECENT DIASTOLIC BLOOD PRESSURE < 80 MM HG: ICD-10-PCS | Mod: CPTII,,, | Performed by: STUDENT IN AN ORGANIZED HEALTH CARE EDUCATION/TRAINING PROGRAM

## 2023-07-14 PROCEDURE — 36415 COLL VENOUS BLD VENIPUNCTURE: CPT | Performed by: STUDENT IN AN ORGANIZED HEALTH CARE EDUCATION/TRAINING PROGRAM

## 2023-07-14 PROCEDURE — 99212 OFFICE O/P EST SF 10 MIN: CPT | Mod: PBBFAC | Performed by: STUDENT IN AN ORGANIZED HEALTH CARE EDUCATION/TRAINING PROGRAM

## 2023-07-14 PROCEDURE — 99999 PR PBB SHADOW E&M-EST. PATIENT-LVL II: CPT | Mod: PBBFAC,,, | Performed by: STUDENT IN AN ORGANIZED HEALTH CARE EDUCATION/TRAINING PROGRAM

## 2023-07-14 PROCEDURE — 3008F PR BODY MASS INDEX (BMI) DOCUMENTED: ICD-10-PCS | Mod: CPTII,,, | Performed by: STUDENT IN AN ORGANIZED HEALTH CARE EDUCATION/TRAINING PROGRAM

## 2023-07-14 PROCEDURE — 3074F PR MOST RECENT SYSTOLIC BLOOD PRESSURE < 130 MM HG: ICD-10-PCS | Mod: CPTII,,, | Performed by: STUDENT IN AN ORGANIZED HEALTH CARE EDUCATION/TRAINING PROGRAM

## 2023-07-14 PROCEDURE — 84439 ASSAY OF FREE THYROXINE: CPT | Performed by: STUDENT IN AN ORGANIZED HEALTH CARE EDUCATION/TRAINING PROGRAM

## 2023-07-14 PROCEDURE — 3074F SYST BP LT 130 MM HG: CPT | Mod: CPTII,,, | Performed by: STUDENT IN AN ORGANIZED HEALTH CARE EDUCATION/TRAINING PROGRAM

## 2023-07-14 RX ORDER — MIRTAZAPINE 15 MG/1
TABLET, FILM COATED ORAL
Qty: 30 TABLET | Refills: 0 | Status: SHIPPED | OUTPATIENT
Start: 2023-07-14

## 2023-07-14 RX ORDER — DULOXETIN HYDROCHLORIDE 30 MG/1
30 CAPSULE, DELAYED RELEASE ORAL DAILY
Qty: 30 CAPSULE | Refills: 0 | Status: SHIPPED | OUTPATIENT
Start: 2023-07-14 | End: 2023-08-13
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
